# Patient Record
Sex: FEMALE | HISPANIC OR LATINO | Employment: FULL TIME | URBAN - METROPOLITAN AREA
[De-identification: names, ages, dates, MRNs, and addresses within clinical notes are randomized per-mention and may not be internally consistent; named-entity substitution may affect disease eponyms.]

---

## 2022-09-06 ENCOUNTER — OFFICE VISIT (OUTPATIENT)
Dept: FAMILY MEDICINE | Facility: CLINIC | Age: 57
End: 2022-09-06
Payer: COMMERCIAL

## 2022-09-06 VITALS
OXYGEN SATURATION: 98 % | DIASTOLIC BLOOD PRESSURE: 77 MMHG | HEART RATE: 64 BPM | TEMPERATURE: 97.8 F | WEIGHT: 173 LBS | SYSTOLIC BLOOD PRESSURE: 114 MMHG

## 2022-09-06 DIAGNOSIS — N64.4 BREAST PAIN, LEFT: Primary | ICD-10-CM

## 2022-09-06 PROCEDURE — 99203 OFFICE O/P NEW LOW 30 MIN: CPT | Performed by: PHYSICIAN ASSISTANT

## 2022-09-06 ASSESSMENT — PAIN SCALES - GENERAL: PAINLEVEL: NO PAIN (0)

## 2022-09-06 NOTE — Clinical Note
Please abstract the following data from this visit with this patient into the appropriate field in Epic:  Tests that can be patient reported without a hard copy:  Mammogram done on this date: 12/9/2021 (approximately), by this group: [facility], results were normal.   Other Tests found in the patient's chart through Chart Review/Care Everywhere:  {Abstract Quality List (Optional):360235}  Note to Abstraction: If this section is blank, no results were found via Chart Review/Care Everywhere.

## 2022-09-06 NOTE — PATIENT INSTRUCTIONS
You are due for a mammogram, which is a study to screen for breast cancer.    [facility]  [address]  [address]  [place] [address]  Appointments: [phone] or [phone]  Hours: M-F 7:00AM-5:00PM

## 2022-09-06 NOTE — PROGRESS NOTES
Assessment & Plan   Problem List Items Addressed This Visit    None     Visit Diagnoses     Breast pain, left    -  Primary    Relevant Orders    MA Diagnostic Digital Bilateral    US Breast Left Complete 4 Quadrants         Diagnostic mammo and US left breast ordered to evaluate left breast pain.  Mammo was normal on 12/9/2021 at [facility]                 Return in about 1 week (around 9/13/2022) for Mammogram.    [facility] is a 57 year old, presenting for the following health issues:  Breast Pain (Left breast)      History of Present Illness       Reason for visit:  Breast pain  Symptom onset:  More than a month  Symptoms include:  Pain  Symptom intensity:  Mild  Symptom progression:  Staying the same  Had these symptoms before:  No  What makes it worse:  N/A  What makes it better:  N/A    She eats 2-3 servings of fruits and vegetables daily.She consumes 0 sweetened beverage(s) daily.She exercises with enough effort to increase her heart rate 9 or less minutes per day.  She exercises with enough effort to increase her heart rate 3 or less days per week.   She is taking medications regularly.     History of benign breast lump in the past  Mammo in 12/9/21 - negative    Breasts are sensitive when compressed  Sometimes it feels like a bruise  Comes and goes  No pain today            Review of Systems         Objective    /77   Pulse 64   Temp 97.8  F (36.6  C) (Temporal)   Wt 78.5 kg (173 lb)   SpO2 98%   There is no height or weight on file to calculate BMI.  Physical Exam  Constitutional:       General: She is not in acute distress.     Appearance: She is well-developed. She is not diaphoretic.   HENT:      Head: Normocephalic.      Right Ear: External ear normal.      Left Ear: External ear normal.      Nose: Nose normal.   Eyes:      Conjunctiva/sclera: Conjunctivae normal.   Pulmonary:      Effort:  Pulmonary effort is normal.   Chest:   Breasts:      Right: No mass, nipple discharge, skin change or axillary adenopathy.      Left: Tenderness (medial breast) present. No mass, nipple discharge, skin change or axillary adenopathy.       Musculoskeletal:      Cervical back: Normal range of motion.   Lymphadenopathy:      Upper Body:      Right upper body: No axillary or pectoral adenopathy.      Left upper body: No axillary or pectoral adenopathy.   Neurological:      Mental Status: She is alert and oriented to person, place, and time.   Psychiatric:         Judgment: Judgment normal.                            @[email or web address]  @[email or web address]@

## 2022-09-09 ENCOUNTER — HOSPITAL ENCOUNTER (OUTPATIENT)
Dept: MAMMOGRAPHY | Facility: CLINIC | Age: 57
Discharge: HOME | End: 2022-09-09
Attending: PHYSICIAN ASSISTANT
Payer: COMMERCIAL

## 2022-09-09 DIAGNOSIS — N64.4 BREAST PAIN, LEFT: ICD-10-CM

## 2022-09-09 PROCEDURE — 77062 BREAST TOMOSYNTHESIS BI: CPT

## 2022-09-09 PROCEDURE — 76642 ULTRASOUND BREAST LIMITED: CPT | Mod: LT
